# Patient Record
Sex: FEMALE | Race: BLACK OR AFRICAN AMERICAN
[De-identification: names, ages, dates, MRNs, and addresses within clinical notes are randomized per-mention and may not be internally consistent; named-entity substitution may affect disease eponyms.]

---

## 2016-07-12 VITALS — DIASTOLIC BLOOD PRESSURE: 86 MMHG | SYSTOLIC BLOOD PRESSURE: 136 MMHG

## 2019-11-22 NOTE — RAD
EXAM: Pelvic sonogram.

 

HISTORY: Pelvic pain. IUD placement.

 

TECHNIQUE: Sonographic imaging of the pelvis was performed.

 

COMPARISON: None.

 

FINDINGS: The uterus measures 8.6 x 6.2 x 4.7 cm. There is an intrauterine

contraceptive device within the endometrial cavity. The ovaries are normal

in size and demonstrate normal blood flow. There is a 4.4 cm right ovarian

cyst. There is no pelvic free fluid.

 

IMPRESSION:

1. 4.4 cm right ovarian cyst.

2. IUD within expected position.

 

Electronically signed by: Kathrine Bragg MD (11/22/2019 4:13 PM) [place] [id number]

## 2020-04-10 ENCOUNTER — HOSPITAL ENCOUNTER (OUTPATIENT)
Dept: HOSPITAL 61 - KCIC CT | Age: 40
End: 2020-04-10
Attending: INTERNAL MEDICINE
Payer: COMMERCIAL

## 2020-04-10 DIAGNOSIS — K76.0: Primary | ICD-10-CM

## 2020-04-10 PROCEDURE — 74177 CT ABD & PELVIS W/CONTRAST: CPT

## 2020-04-10 NOTE — [facility]
EXAM: Abdomen and pelvis CT with intravenous contrast.

 

HISTORY: Pain.

 

TECHNIQUE: Computed tomographic images of the abdomen and pelvis were 

obtained following the administration of intravenous contrast. Multiplanar

reformatting was performed.

 

*One or more of the following individualized dose reduction techniques 

were utilized for this examination:  

1. Automated exposure control.  

2. Adjustment of the mA and/or kV according to patient size.  

3. Use of iterative reconstruction technique.

 

COMPARISON: None.

 

FINDINGS: Evaluation of the lower thorax is unremarkable. There is a tiny 

hypodense lesion within the right hepatic lobe, too small to characterize.

There is minimal fatty infiltration of the liver along the falciform 

ligament. The gallbladder, pancreas and spleen are unremarkable. There is 

a 2.8 cm hypodense lesion within the left adrenal gland, the attenuation 

which favors a benign adenoma. The kidneys are unremarkable.

 

There is no appendicitis. There is no bowel obstruction. There is no 

abnormal bowel wall thickening. The urinary bladder is unremarkable. There

is an IUD within the endometrial cavity. There are multiple ovarian 

follicles. There is a dominant right ovarian follicle/follicular cyst 

measuring 2.2 cm. There is a small amount of pelvic free fluid, within 

physiologic limits. There is no lymphadenopathy. There is no suspicious 

osseous lesion.

 

IMPRESSION: 

1. 2.8 cm left adrenal nodule, the attenuation which favors an adenoma.

2. Suspected tiny incidental hepatic cyst or hemangioma, too small to 

characterize.

3. Dominant physiologic right ovarian follicle/follicular cyst measuring 

2.2 cm. There is also a small amount of physiologic pelvic free fluid.

 

Electronically signed by: Kathrine Bragg MD (4/10/2020 9:35 AM) [facility]

## 2020-07-20 ENCOUNTER — HOSPITAL ENCOUNTER (EMERGENCY)
Dept: HOSPITAL 61 - ER | Age: 40
Discharge: HOME | End: 2020-07-20
Payer: COMMERCIAL

## 2020-07-20 VITALS — HEIGHT: 69 IN | BODY MASS INDEX: 26.78 KG/M2 | WEIGHT: 180.78 LBS

## 2020-07-20 VITALS — SYSTOLIC BLOOD PRESSURE: 161 MMHG | DIASTOLIC BLOOD PRESSURE: 92 MMHG

## 2020-07-20 DIAGNOSIS — I10: ICD-10-CM
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
## 2020-07-20 DIAGNOSIS — R07.81: ICD-10-CM
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
## 2020-07-20 DIAGNOSIS — S46.912A: Primary | ICD-10-CM
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
## 2020-07-20 DIAGNOSIS — Z88.8: ICD-10-CM

## 2020-07-20 DIAGNOSIS — J45.909: ICD-10-CM

## 2020-07-20 PROCEDURE — 81025 URINE PREGNANCY TEST: CPT

## 2020-07-20 PROCEDURE — 71101 X-RAY EXAM UNILAT RIBS/CHEST: CPT

## 2020-07-20 PROCEDURE — 99283 EMERGENCY DEPT VISIT LOW MDM: CPT

## 2020-07-20 NOTE — RAD
EXAM: Chest and left ribs, 5 views.

 

HISTORY: Pain.

 

COMPARISON: None.

 

FINDINGS: A frontal view of the chest and 4 views of the left ribs are 

obtained. There is no infiltrate, pleural effusion or pneumothorax. The 

heart is normal in size. No displaced fracture is seen.

 

IMPRESSION: No acute pulmonary or osseous finding. 

 

Electronically signed by: Kathrine Bragg MD (7/20/2020 1:02 PM) [id number]
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
